# Patient Record
Sex: FEMALE | Race: WHITE | NOT HISPANIC OR LATINO | Employment: FULL TIME | URBAN - METROPOLITAN AREA
[De-identification: names, ages, dates, MRNs, and addresses within clinical notes are randomized per-mention and may not be internally consistent; named-entity substitution may affect disease eponyms.]

---

## 2023-03-20 ENCOUNTER — HOSPITAL ENCOUNTER (OUTPATIENT)
Dept: RADIOLOGY | Facility: HOSPITAL | Age: 53
Discharge: HOME | End: 2023-03-20
Attending: PODIATRIST
Payer: MEDICAID

## 2023-03-20 DIAGNOSIS — M77.41 METATARSALGIA OF RIGHT FOOT: ICD-10-CM

## 2023-03-20 DIAGNOSIS — M71.571 OTHER BURSITIS, NOT ELSEWHERE CLASSIFIED, RIGHT ANKLE AND FOOT: ICD-10-CM

## 2023-03-20 DIAGNOSIS — M77.41 METATARSALGIA OF RIGHT FOOT: Primary | ICD-10-CM

## 2023-03-20 PROCEDURE — 73630 X-RAY EXAM OF FOOT: CPT | Mod: 26,RT,, | Performed by: RADIOLOGY

## 2023-03-20 PROCEDURE — 73630 XR FOOT COMPLETE 3 VIEW RIGHT: ICD-10-PCS | Mod: 26,RT,, | Performed by: RADIOLOGY

## 2023-03-20 PROCEDURE — 73630 X-RAY EXAM OF FOOT: CPT | Mod: TC,FY,RT

## 2025-02-06 ENCOUNTER — HOSPITAL ENCOUNTER (EMERGENCY)
Facility: HOSPITAL | Age: 55
Discharge: HOME | End: 2025-02-06
Attending: EMERGENCY MEDICINE
Payer: MEDICAID

## 2025-02-06 VITALS
BODY MASS INDEX: 32.14 KG/M2 | TEMPERATURE: 99 F | DIASTOLIC BLOOD PRESSURE: 73 MMHG | SYSTOLIC BLOOD PRESSURE: 142 MMHG | OXYGEN SATURATION: 97 % | WEIGHT: 200 LBS | RESPIRATION RATE: 18 BRPM | HEIGHT: 66 IN | HEART RATE: 93 BPM

## 2025-02-06 DIAGNOSIS — M79.601 RIGHT ARM PAIN: ICD-10-CM

## 2025-02-06 DIAGNOSIS — S42.401A CLOSED FRACTURE DISLOCATION OF RIGHT ELBOW, INITIAL ENCOUNTER: ICD-10-CM

## 2025-02-06 DIAGNOSIS — M25.562 LEFT KNEE PAIN: ICD-10-CM

## 2025-02-06 DIAGNOSIS — M25.522 LEFT ELBOW PAIN: ICD-10-CM

## 2025-02-06 DIAGNOSIS — S00.83XA CONTUSION OF FACE, INITIAL ENCOUNTER: Primary | ICD-10-CM
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
## 2025-02-06 DIAGNOSIS — S00.81XA ABRASION OF FACE, INITIAL ENCOUNTER: ICD-10-CM

## 2025-02-06 DIAGNOSIS — S52.124A CLOSED NONDISPLACED FRACTURE OF HEAD OF RIGHT RADIUS, INITIAL ENCOUNTER: ICD-10-CM

## 2025-02-06 PROCEDURE — 63600175 PHARM REV CODE 636 W HCPCS: Mod: ER | Performed by: EMERGENCY MEDICINE

## 2025-02-06 PROCEDURE — 90471 IMMUNIZATION ADMIN: CPT | Mod: ER | Performed by: EMERGENCY MEDICINE

## 2025-02-06 PROCEDURE — 96374 THER/PROPH/DIAG INJ IV PUSH: CPT | Mod: ER

## 2025-02-06 PROCEDURE — 90715 TDAP VACCINE 7 YRS/> IM: CPT | Mod: ER | Performed by: EMERGENCY MEDICINE

## 2025-02-06 PROCEDURE — 25000003 PHARM REV CODE 250: Mod: ER | Performed by: EMERGENCY MEDICINE

## 2025-02-06 PROCEDURE — 99285 EMERGENCY DEPT VISIT HI MDM: CPT | Mod: 25,ER

## 2025-02-06 PROCEDURE — 96372 THER/PROPH/DIAG INJ SC/IM: CPT | Performed by: EMERGENCY MEDICINE

## 2025-02-06 RX ORDER — ORPHENADRINE CITRATE 100 MG/1
100 TABLET, EXTENDED RELEASE ORAL
Status: COMPLETED | OUTPATIENT
Start: 2025-02-06 | End: 2025-02-06

## 2025-02-06 RX ORDER — MUPIROCIN 20 MG/G
OINTMENT TOPICAL 3 TIMES DAILY
Qty: 60 G | Refills: 0 | Status: SHIPPED | OUTPATIENT
Start: 2025-02-06 | End: 2025-02-16

## 2025-02-06 RX ORDER — OXYCODONE AND ACETAMINOPHEN 5; 325 MG/1; MG/1
1 TABLET ORAL EVERY 4 HOURS PRN
Qty: 12 TABLET | Refills: 0 | Status: SHIPPED | OUTPATIENT
Start: 2025-02-06

## 2025-02-06 RX ORDER — METHOCARBAMOL 500 MG/1
1000 TABLET, FILM COATED ORAL 3 TIMES DAILY
Qty: 30 TABLET | Refills: 0 | Status: SHIPPED | OUTPATIENT
Start: 2025-02-06 | End: 2025-02-11

## 2025-02-06 RX ORDER — IBUPROFEN 600 MG/1
600 TABLET ORAL EVERY 6 HOURS PRN
Qty: 20 TABLET | Refills: 0 | Status: SHIPPED | OUTPATIENT
Start: 2025-02-06

## 2025-02-06 RX ORDER — MORPHINE SULFATE 4 MG/ML
4 INJECTION, SOLUTION INTRAMUSCULAR; INTRAVENOUS
Status: COMPLETED | OUTPATIENT
Start: 2025-02-06 | End: 2025-02-06

## 2025-02-06 RX ORDER — KETOROLAC TROMETHAMINE 30 MG/ML
30 INJECTION, SOLUTION INTRAMUSCULAR; INTRAVENOUS
Status: COMPLETED | OUTPATIENT
Start: 2025-02-06 | End: 2025-02-06

## 2025-02-06 RX ORDER — ACETAMINOPHEN 500 MG
500 TABLET ORAL EVERY 6 HOURS PRN
Qty: 30 TABLET | Refills: 0 | Status: SHIPPED | OUTPATIENT
Start: 2025-02-06

## 2025-02-06 RX ORDER — OXYCODONE AND ACETAMINOPHEN 5; 325 MG/1; MG/1
1 TABLET ORAL
Status: COMPLETED | OUTPATIENT
Start: 2025-02-06 | End: 2025-02-06

## 2025-02-06 RX ADMIN — CLOSTRIDIUM TETANI TOXOID ANTIGEN (FORMALDEHYDE INACTIVATED), CORYNEBACTERIUM DIPHTHERIAE TOXOID ANTIGEN (FORMALDEHYDE INACTIVATED), BORDETELLA PERTUSSIS TOXOID ANTIGEN (GLUTARALDEHYDE INACTIVATED), BORDETELLA PERTUSSIS FILAMENTOUS HEMAGGLUTININ ANTIGEN (FORMALDEHYDE INACTIVATED), BORDETELLA PERTUSSIS PERTACTIN ANTIGEN, AND BORDETELLA PERTUSSIS FIMBRIAE 2/3 ANTIGEN 0.5 ML: 5; 2; 2.5; 5; 3; 5 INJECTION, SUSPENSION INTRAMUSCULAR at 04:02

## 2025-02-06 RX ADMIN — ORPHENADRINE CITRATE 100 MG: 100 TABLET, EXTENDED RELEASE ORAL at 05:02

## 2025-02-06 RX ADMIN — KETOROLAC TROMETHAMINE 30 MG: 30 INJECTION, SOLUTION INTRAMUSCULAR at 05:02

## 2025-02-06 RX ADMIN — MORPHINE SULFATE 4 MG: 4 INJECTION, SOLUTION INTRAMUSCULAR; INTRAVENOUS at 06:02

## 2025-02-06 RX ADMIN — OXYCODONE HYDROCHLORIDE AND ACETAMINOPHEN 1 TABLET: 5; 325 TABLET ORAL at 04:02

## 2025-02-06 RX ADMIN — Medication: at 06:02

## 2025-02-06 NOTE — DISCHARGE INSTRUCTIONS
Follow up with Orthopedics this Monday at 1:00 p.m..  Wear your sling and swath.  Go to [facility] ED if symptoms worsen or do not improve.      Please follow up with primary care physician for further evaluation of abnormal CT showing: Left thyroid lobe 1.5 cm nodule. Future nonemergent outpatient thyroid ultrasound follow-up is recommended if not previously obtained.   Please sign up for and monitor all results on [facility] patient portal.    Please return to the ED for any new, concerning symptoms, or worsening symptoms.    Please follow-up with your primary care provider within 1 day.  An ER visit can not replace the evaluation by your primary care physician.    In the emergency department it is our goal to rule out life-threatening conditions and make sure that you are safe to be discharged home.    Many medical conditions are difficult to diagnose and may be impossible to diagnosed during a single ER visit.  Many health conditions start with nonspecific symptoms and can only be diagnosed on follow-up visits with your primary care physician or specialist.    Please be aware that all medical conditions can change and we can not predict how you will be feeling tomorrow or the next day.   If you have any worsening or new symptoms you should not hesitate to return to the emergency department for re-evaluation.  Be sure to follow up with your primary care physician for recheck and review any labs or imaging that were performed today.  It is very common for us to identify non emergent incidental findings on labs and imaging which must be followed up with your primary care physician.   If you do not have a primary care physician, you may contact the 1 listed on your discharge paperwork or you can also call the [facility] clinic appointment desk at [phone] to schedule an appointment.

## 2025-02-06 NOTE — ED PROVIDER NOTES
Encounter Date: 2/6/2025    SCRIBE #1 NOTE: I, Michelle Cramer, am scribing for, and in the presence of,  Lachelle Grayson DO.       History     Chief Complaint   Patient presents with    Fall     Pt had a trip and fall PTA injuring her nose, chin, bilateral face, and bilateral arms. Denies LOC     Eli Mojica is a 54 y.o. female, with no pertinent PMHx, who presents to the ED for evaluation following a mechanical fall PTA. Patient reports a trip and fall at school today, falling onto her face. States she was carrying her laptop and school bag, and tripped on the concrete ledge, falling onto concrete flat on her face. She denies LOC. She reports sustaining multiple bleeding wounds to her face, bilateral elbow pain, right hand pain, right forearm pain, left knee pain, and a mild posterior headache. Unknown last Tdap. No other exacerbating or alleviating factors. Denies fever, nausea, vomiting or other associated symptoms.       The history is provided by the patient. No  was used.     Review of patient's allergies indicates:  No Known Allergies  No past medical history on file.  No past surgical history on file.  No family history on file.     Review of Systems   Constitutional:  Negative for fever.   HENT:  Negative for nosebleeds, rhinorrhea and sore throat.    Eyes:  Negative for redness.   Respiratory:  Negative for shortness of breath.    Cardiovascular:  Negative for chest pain and leg swelling.   Gastrointestinal:  Negative for abdominal pain, diarrhea, nausea and vomiting.   Musculoskeletal:  Negative for back pain and neck pain.        (+) bilateral elbow pain   (+) left knee pain   (+) right hand pain   (+) right forearm pain    Skin:  Positive for wound. Negative for rash.   Neurological:  Positive for headaches. Negative for syncope.   All other systems reviewed and are negative.      Physical Exam     Initial Vitals [02/06/25 1607]   BP Pulse Resp Temp SpO2   (!) 147/92 88 16 98.5  °F (36.9 °C) 98 %      MAP       --         Physical Exam    Nursing note and vitals reviewed.  Constitutional: She appears well-developed and well-nourished.   HENT:   Head: Normocephalic.   Right Ear: Tympanic membrane and external ear normal.   Left Ear: Tympanic membrane and external ear normal.   Nose: Nose normal. Mouth/Throat: Oropharynx is clear and moist. Normal dentition.    Multiple abrasions to the face. No chipped teeth visualized. Oropharynx patent.    Eyes: Conjunctivae and EOM are normal. Pupils are equal, round, and reactive to light.   Neck: Phonation normal. Neck supple.   Normal range of motion.  Cardiovascular:  Normal rate, regular rhythm, normal heart sounds and intact distal pulses.     Exam reveals no gallop and no friction rub.       No murmur heard.  Pulmonary/Chest: Effort normal and breath sounds normal. No stridor. No respiratory distress. She has no wheezes. She has no rhonchi. She has no rales. She exhibits no tenderness.   Abdominal: Abdomen is soft. Bowel sounds are normal. She exhibits no distension. There is no abdominal tenderness. There is no rigidity, no rebound and no guarding.   Musculoskeletal:      Cervical back: Normal range of motion and neck supple.      Comments: Left elbow TTP. Swelling to right hand and forearm.     Neurological: She is alert and oriented to person, place, and time. She has normal strength. No cranial nerve deficit or sensory deficit. GCS score is 15. GCS eye subscore is 4. GCS verbal subscore is 5. GCS motor subscore is 6.   Skin: Skin is warm and dry. Capillary refill takes less than 2 seconds. No rash noted.   Psychiatric: She has a normal mood and affect. Her behavior is normal.         ED Course   Procedures  Labs Reviewed - No data to display       Imaging Results              X-Ray Humerus 2 View Right (Final result)  Result time 02/06/25 19:26:52      Final result by Sanam David MD (02/06/25 19:26:52)                   Impression:       As above.      Electronically signed by: Sanam David MD  Date:    02/06/2025  Time:    19:26               Narrative:    EXAMINATION:  XR HUMERUS 2 VIEW RIGHT    CLINICAL HISTORY:  Unspecified fall, initial encounter    TECHNIQUE:  Right humerus AP and lateral.    COMPARISON:  Right elbow radiographs from the same date.    FINDINGS:  Redemonstration of acute mild displaced radial head fracture.  No additional acute displaced fracture or dislocation seen.                                       CT Arm Elbow Without Contrast Right (Final result)  Result time 02/06/25 19:25:42      Final result by Sanam David MD (02/06/25 19:25:42)                   Impression:      Acute radial head fracture.      Electronically signed by: Sanam David MD  Date:    02/06/2025  Time:    19:25               Narrative:    EXAMINATION:  CT ARM ELBOW WITHOUT CONTRAST RIGHT    CLINICAL HISTORY:  Fracture, elbow;    TECHNIQUE:  CT of the right elbow was obtained without the use of IV contrast.  Sagittal and coronal reformats were obtained.    COMPARISON:  Right elbow radiographs from the same date.    FINDINGS:  Suboptimal positioning.  There is redemonstration of acute displaced fracture of the radial head.  This is better appreciated on earlier radiographs.  No additional acute displaced fracture or dislocation seen.  Elbow joint effusion is seen.                                       X-Ray Elbow Complete Right (Final result)  Result time 02/06/25 17:34:14      Final result by Sanam David MD (02/06/25 17:34:14)                   Impression:      Acute radial head fracture with elbow joint effusion.      Electronically signed by: Sanam David MD  Date:    02/06/2025  Time:    17:34               Narrative:    EXAMINATION:  XR ELBOW COMPLETE 3 VIEW RIGHT; XR FOREARM RIGHT; XR HAND COMPLETE 3 VIEW RIGHT    CLINICAL HISTORY:  Fall with right hand pain and swelling;. Unspecified fall, initial encounter; Pain in right  arm    TECHNIQUE:  AP, lateral, and oblique views of the right elbow were performed.  Right forearm AP and lateral.  Right hand three views.    COMPARISON:  None    FINDINGS:  Acute mild displaced fracture is seen of the radial head.  Elbow joint effusion is seen.  Elbow spurring is noted.  No additional acute displaced fracture or dislocation seen.                                       X-Ray Forearm Right (Final result)  Result time 02/06/25 17:34:14      Final result by Sanam David MD (02/06/25 17:34:14)                   Impression:      Acute radial head fracture with elbow joint effusion.      Electronically signed by: Sanam David MD  Date:    02/06/2025  Time:    17:34               Narrative:    EXAMINATION:  XR ELBOW COMPLETE 3 VIEW RIGHT; XR FOREARM RIGHT; XR HAND COMPLETE 3 VIEW RIGHT    CLINICAL HISTORY:  Fall with right hand pain and swelling;. Unspecified fall, initial encounter; Pain in right arm    TECHNIQUE:  AP, lateral, and oblique views of the right elbow were performed.  Right forearm AP and lateral.  Right hand three views.    COMPARISON:  None    FINDINGS:  Acute mild displaced fracture is seen of the radial head.  Elbow joint effusion is seen.  Elbow spurring is noted.  No additional acute displaced fracture or dislocation seen.                                       X-Ray Hand 3 view Right (Final result)  Result time 02/06/25 17:34:14      Final result by Sanam David MD (02/06/25 17:34:14)                   Impression:      Acute radial head fracture with elbow joint effusion.      Electronically signed by: Sanam David MD  Date:    02/06/2025  Time:    17:34               Narrative:    EXAMINATION:  XR ELBOW COMPLETE 3 VIEW RIGHT; XR FOREARM RIGHT; XR HAND COMPLETE 3 VIEW RIGHT    CLINICAL HISTORY:  Fall with right hand pain and swelling;. Unspecified fall, initial encounter; Pain in right arm    TECHNIQUE:  AP, lateral, and oblique views of the right elbow were performed.   Right forearm AP and lateral.  Right hand three views.    COMPARISON:  None    FINDINGS:  Acute mild displaced fracture is seen of the radial head.  Elbow joint effusion is seen.  Elbow spurring is noted.  No additional acute displaced fracture or dislocation seen.                                       X-Ray Elbow Complete Left (Final result)  Result time 02/06/25 17:35:32      Final result by Sanam David MD (02/06/25 17:35:32)                   Impression:      Elbow joint effusion.  No acute displaced fracture seen.      Electronically signed by: Sanam David MD  Date:    02/06/2025  Time:    17:35               Narrative:    EXAMINATION:  XR ELBOW COMPLETE 3 VIEW LEFT    CLINICAL HISTORY:  Pain in left elbow    TECHNIQUE:  AP, lateral, and oblique views of the left elbow were performed.    COMPARISON:  None    FINDINGS:  No acute displaced fracture is identified.  There is mild spurring at the coronoid process and medial and lateral epicondyles.  Elbow joint effusion is seen.                                       X-Ray Knee 3 View Left (Final result)  Result time 02/06/25 17:37:31      Final result by Sanam David MD (02/06/25 17:37:31)                   Impression:      No acute displaced fracture seen.      Electronically signed by: Sanam David MD  Date:    02/06/2025  Time:    17:37               Narrative:    EXAMINATION:  XR KNEE 3 VIEW LEFT    CLINICAL HISTORY:  Unspecified fall, initial encounter    TECHNIQUE:  AP, lateral, and Merchant views of the left knee were performed.    COMPARISON:  None    FINDINGS:  No evidence of acute displaced fracture, dislocation, or osseous destructive process.  Joint spaces appear fairly well preserved.  There is mild degenerative spurring involving the medial tibiofemoral compartment.  Prominent patellar spurring is seen.  There is prominent corticated calcification or possible fragmented spurring seen in the suprapatellar joint space.  No significant  suprapatellar joint effusion.                                        CT Cervical Spine Without Contrast (Final result)  Result time 02/06/25 17:24:31      Final result by Sanam David MD (02/06/25 17:24:31)                   Impression:      No evidence of acute cervical spine fracture or dislocation.    Left thyroid lobe 1.5 cm nodule.  Future nonemergent outpatient thyroid ultrasound follow-up is recommended if not previously obtained.    This report was flagged in Epic as containing an incidental finding.      Electronically signed by: Sanam David MD  Date:    02/06/2025  Time:    17:24               Narrative:    EXAMINATION:  CT CERVICAL SPINE WITHOUT CONTRAST    CLINICAL HISTORY:  Neck trauma, midline tenderness (Age 16-64y);    TECHNIQUE:  Low dose axial images, sagittal and coronal reformations were performed though the cervical spine.  Contrast was not administered.    COMPARISON:  None    FINDINGS:  No evidence of acute cervical spine fracture or dislocation.  Odontoid process is intact.  Craniocervical junction is unremarkable.  Cervical spine alignment is within normal limits.    Surrounding soft tissues show no acute abnormalities.  There is a 1.5 cm hypodense left thyroid lobe nodule..  Airway is patent.  Partially visualized lung apices are clear.                                       CT Maxillofacial Without Contrast (Final result)  Result time 02/06/25 17:22:33      Final result by Sanam David MD (02/06/25 17:22:33)                   Impression:      No acute intracranial abnormalities identified.    No acute facial fractures identified.      Electronically signed by: Sanam David MD  Date:    02/06/2025  Time:    17:22               Narrative:    EXAMINATION:  CT HEAD WITHOUT CONTRAST; CT MAXILLOFACIAL WITHOUT CONTRAST    CLINICAL HISTORY:  Head trauma, moderate-severe;; Facial trauma, blunt;    TECHNIQUE:  Low dose axial images were obtained through the head and maxillofacial  region.  Coronal and sagittal reformations were also performed. Contrast was not administered.  Rapid AI screening was performed.    COMPARISON:  None.    FINDINGS:  No evidence of acute/recent major vascular distribution cerebral infarction, intraparenchymal hemorrhage, or intra-axial space occupying lesion. The ventricular system is normal in size and configuration with no evidence of hydrocephalus. No effacement of the skull-base cisterns. No abnormal extra-axial fluid collections or blood products.    No acute facial fractures are identified.  Orbits are normal in appearance.  Visualized paranasal sinuses and mastoid air cells are clear. The calvarium shows no significant abnormality.                                       CT Head Without Contrast (Final result)  Result time 02/06/25 17:22:33      Final result by Sanam David MD (02/06/25 17:22:33)                   Impression:      No acute intracranial abnormalities identified.    No acute facial fractures identified.      Electronically signed by: Sanam David MD  Date:    02/06/2025  Time:    17:22               Narrative:    EXAMINATION:  CT HEAD WITHOUT CONTRAST; CT MAXILLOFACIAL WITHOUT CONTRAST    CLINICAL HISTORY:  Head trauma, moderate-severe;; Facial trauma, blunt;    TECHNIQUE:  Low dose axial images were obtained through the head and maxillofacial region.  Coronal and sagittal reformations were also performed. Contrast was not administered.  Rapid AI screening was performed.    COMPARISON:  None.    FINDINGS:  No evidence of acute/recent major vascular distribution cerebral infarction, intraparenchymal hemorrhage, or intra-axial space occupying lesion. The ventricular system is normal in size and configuration with no evidence of hydrocephalus. No effacement of the skull-base cisterns. No abnormal extra-axial fluid collections or blood products.    No acute facial fractures are identified.  Orbits are normal in appearance.  Visualized  paranasal sinuses and mastoid air cells are clear. The calvarium shows no significant abnormality.                                       Medications   Tdap vaccine injection 0.5 mL (0.5 mLs Intramuscular Given 2/6/25 1638)   oxyCODONE-acetaminophen 5-325 mg per tablet 1 tablet (1 tablet Oral Given 2/6/25 1645)   ketorolac injection 30 mg (30 mg Intramuscular Given 2/6/25 1759)   orphenadrine 12 hr tablet 100 mg (100 mg Oral Given 2/6/25 1759)   LETS (LIDOcaine-TETRAcaine-EPINEPHrine) gel solution ( Topical (Top) Given 2/6/25 1800)   morphine injection 4 mg (4 mg Intravenous Given 2/6/25 1811)     Medical Decision Making  Care of this patient was transferred to me (Dr. Pizarro) from Dr. Grayson at shift change pending results of CT of the right elbow.  CT of the right elbow reveals acute radial head fracture without involvement of the humerus.  Sling was placed and patient was advised to follow-up with orthopedic surgery clinic at 1:00 p.m. on Monday, February 10th-2025.  Prescriptions for Norco/ibuprofen/acetaminophen/mupirocin were given and she was also given  strict instructions to return to the emergency department if condition worsens.    Amount and/or Complexity of Data Reviewed  Radiology: ordered. Decision-making details documented in ED Course.    Risk  OTC drugs.  Prescription drug management.    Medical Decision Making:    This is an evaluation of a 54 y.o. female that presents to the Emergency Department for   Chief Complaint   Patient presents with    Fall     Pt had a trip and fall PTA injuring her nose, chin, bilateral face, and bilateral arms. Denies LOC         The patient is a non-toxic and well appearing patient. On physical exam, patient appears well hydrated with moist mucus membranes. Breath sounds are clear and equal bilaterally with no adventitious breath sounds, tachypnea or respiratory distress. Regular rate and rhythm. No murmurs. Abdomen soft and non tender. Patient is tolerating PO without  difficulty.  Multiple abrasions to the face. No chipped teeth visualized. Oropharynx patent. Left elbow TTP. Swelling to right hand and forearm. Physical exam otherwise as above.     I have reviewed vital signs and nursing notes.   Vital Signs Are Reassuring.     Based on the patient's symptoms, I am considering and evaluating for the following differential diagnoses: Sprain, Strain, Contusion, Dislocation, Fracture, abrasion        ED Course:Treatment in the ED included Physical Exam and medications given in ED  Medications   Tdap vaccine injection 0.5 mL (0.5 mLs Intramuscular Given 2/6/25 1638)   oxyCODONE-acetaminophen 5-325 mg per tablet 1 tablet (1 tablet Oral Given 2/6/25 1645)   ketorolac injection 30 mg (30 mg Intramuscular Given 2/6/25 1759)   orphenadrine 12 hr tablet 100 mg (100 mg Oral Given 2/6/25 1759)   LETS (LIDOcaine-TETRAcaine-EPINEPHrine) gel solution ( Topical (Top) Given 2/6/25 1800)   morphine injection 4 mg (4 mg Intravenous Given 2/6/25 1811)   I consulted orthopedics at 1750.       Patient reports feeling better after treatment in the ER.       In shared decision making with the patient, we discussed treatment, prescriptions, labs, and imaging results.    Discharge home with   ED Prescriptions       Medication Sig Dispense Start Date End Date Auth. Provider    methocarbamoL (ROBAXIN) 500 MG Tab Take 2 tablets (1,000 mg total) by mouth 3 (three) times daily. for 5 days 30 tablet 2/6/2025 2/11/2025 Lachelle Grayson,     acetaminophen (TYLENOL) 500 MG tablet Take 1 tablet (500 mg total) by mouth every 6 (six) hours as needed for Pain (As needed for mild-to-moderate pain). 30 tablet 2/6/2025 -- Lachelle Grayson,     ibuprofen (ADVIL,MOTRIN) 600 MG tablet Take 1 tablet (600 mg total) by mouth every 6 (six) hours as needed for Pain (Take with food as needed for mild-to-moderate pain). 20 tablet 2/6/2025 -- Lachelle Grayson, DO    mupirocin (BACTROBAN) 2 % ointment Apply topically 3 (three) times daily.  for 10 days 60 g 2/6/2025 2/16/2025 Lachelle Grayson DO    oxyCODONE-acetaminophen (PERCOCET) 5-325 mg per tablet Take 1 tablet by mouth every 4 (four) hours as needed for Pain. 12 tablet 2/6/2025 -- Lachelle Grayson DO            The following labs and imaging were reviewed:        No visits with results within 1 Day(s) from this visit.   Latest known visit with results is:   Lab Visit on 03/20/2023   Component Date Value Ref Range Status    Uric Acid 03/20/2023 5.1  2.4 - 5.7 mg/dL Final    WBC 03/20/2023 8.69  3.90 - 12.70 K/uL Final    RBC 03/20/2023 4.41  4.00 - 5.40 M/uL Final    Hemoglobin 03/20/2023 11.9 (L)  12.0 - 16.0 g/dL Final    Hematocrit 03/20/2023 36.8 (L)  37.0 - 48.5 % Final    MCV 03/20/2023 83  82 - 98 fL Final    MCH 03/20/2023 27.0  27.0 - 31.0 pg Final    MCHC 03/20/2023 32.3  32.0 - 36.0 g/dL Final    RDW 03/20/2023 12.7  11.5 - 14.5 % Final    Platelets 03/20/2023 354  150 - 450 K/uL Final    MPV 03/20/2023 9.1 (L)  9.2 - 12.9 fL Final    Immature Granulocytes 03/20/2023 0.5  0.0 - 0.5 % Final    Gran # (ANC) 03/20/2023 5.7  1.8 - 7.7 K/uL Final    Immature Grans (Abs) 03/20/2023 0.04  0.00 - 0.04 K/uL Final    Comment: Mild elevation in immature granulocytes is non specific and   can be seen in a variety of conditions including stress response,   acute inflammation, trauma and pregnancy. Correlation with other   laboratory and clinical findings is essential.      Lymph # 03/20/2023 2.2  1.0 - 4.8 K/uL Final    Mono # 03/20/2023 0.5  0.3 - 1.0 K/uL Final    Eos # 03/20/2023 0.2  0.0 - 0.5 K/uL Final    Baso # 03/20/2023 0.05  0.00 - 0.20 K/uL Final    nRBC 03/20/2023 0  0 /100 WBC Final    Gran % 03/20/2023 65.0  38.0 - 73.0 % Final    Lymph % 03/20/2023 25.2  18.0 - 48.0 % Final    Mono % 03/20/2023 6.2  4.0 - 15.0 % Final    Eosinophil % 03/20/2023 2.5  0.0 - 8.0 % Final    Basophil % 03/20/2023 0.6  0.0 - 1.9 % Final    Differential Method 03/20/2023 Automated   Final        Imaging Results               X-Ray Humerus 2 View Right (Final result)  Result time 02/06/25 19:26:52      Final result by Sanam David MD (02/06/25 19:26:52)                   Impression:      As above.      Electronically signed by: Sanam David MD  Date:    02/06/2025  Time:    19:26               Narrative:    EXAMINATION:  XR HUMERUS 2 VIEW RIGHT    CLINICAL HISTORY:  Unspecified fall, initial encounter    TECHNIQUE:  Right humerus AP and lateral.    COMPARISON:  Right elbow radiographs from the same date.    FINDINGS:  Redemonstration of acute mild displaced radial head fracture.  No additional acute displaced fracture or dislocation seen.                                       CT Arm Elbow Without Contrast Right (Final result)  Result time 02/06/25 19:25:42      Final result by Sanam David MD (02/06/25 19:25:42)                   Impression:      Acute radial head fracture.      Electronically signed by: Sanam David MD  Date:    02/06/2025  Time:    19:25               Narrative:    EXAMINATION:  CT ARM ELBOW WITHOUT CONTRAST RIGHT    CLINICAL HISTORY:  Fracture, elbow;    TECHNIQUE:  CT of the right elbow was obtained without the use of IV contrast.  Sagittal and coronal reformats were obtained.    COMPARISON:  Right elbow radiographs from the same date.    FINDINGS:  Suboptimal positioning.  There is redemonstration of acute displaced fracture of the radial head.  This is better appreciated on earlier radiographs.  No additional acute displaced fracture or dislocation seen.  Elbow joint effusion is seen.                                       X-Ray Elbow Complete Right (Final result)  Result time 02/06/25 17:34:14      Final result by Sanam David MD (02/06/25 17:34:14)                   Impression:      Acute radial head fracture with elbow joint effusion.      Electronically signed by: Sanam David MD  Date:    02/06/2025  Time:    17:34               Narrative:    EXAMINATION:  XR ELBOW COMPLETE 3  VIEW RIGHT; XR FOREARM RIGHT; XR HAND COMPLETE 3 VIEW RIGHT    CLINICAL HISTORY:  Fall with right hand pain and swelling;. Unspecified fall, initial encounter; Pain in right arm    TECHNIQUE:  AP, lateral, and oblique views of the right elbow were performed.  Right forearm AP and lateral.  Right hand three views.    COMPARISON:  None    FINDINGS:  Acute mild displaced fracture is seen of the radial head.  Elbow joint effusion is seen.  Elbow spurring is noted.  No additional acute displaced fracture or dislocation seen.                                       X-Ray Forearm Right (Final result)  Result time 02/06/25 17:34:14      Final result by Sanam David MD (02/06/25 17:34:14)                   Impression:      Acute radial head fracture with elbow joint effusion.      Electronically signed by: Sanam David MD  Date:    02/06/2025  Time:    17:34               Narrative:    EXAMINATION:  XR ELBOW COMPLETE 3 VIEW RIGHT; XR FOREARM RIGHT; XR HAND COMPLETE 3 VIEW RIGHT    CLINICAL HISTORY:  Fall with right hand pain and swelling;. Unspecified fall, initial encounter; Pain in right arm    TECHNIQUE:  AP, lateral, and oblique views of the right elbow were performed.  Right forearm AP and lateral.  Right hand three views.    COMPARISON:  None    FINDINGS:  Acute mild displaced fracture is seen of the radial head.  Elbow joint effusion is seen.  Elbow spurring is noted.  No additional acute displaced fracture or dislocation seen.                                       X-Ray Hand 3 view Right (Final result)  Result time 02/06/25 17:34:14      Final result by Sanam David MD (02/06/25 17:34:14)                   Impression:      Acute radial head fracture with elbow joint effusion.      Electronically signed by: Sanam David MD  Date:    02/06/2025  Time:    17:34               Narrative:    EXAMINATION:  XR ELBOW COMPLETE 3 VIEW RIGHT; XR FOREARM RIGHT; XR HAND COMPLETE 3 VIEW RIGHT    CLINICAL  HISTORY:  Fall with right hand pain and swelling;. Unspecified fall, initial encounter; Pain in right arm    TECHNIQUE:  AP, lateral, and oblique views of the right elbow were performed.  Right forearm AP and lateral.  Right hand three views.    COMPARISON:  None    FINDINGS:  Acute mild displaced fracture is seen of the radial head.  Elbow joint effusion is seen.  Elbow spurring is noted.  No additional acute displaced fracture or dislocation seen.                                       X-Ray Elbow Complete Left (Final result)  Result time 02/06/25 17:35:32      Final result by Sanam David MD (02/06/25 17:35:32)                   Impression:      Elbow joint effusion.  No acute displaced fracture seen.      Electronically signed by: Sanam David MD  Date:    02/06/2025  Time:    17:35               Narrative:    EXAMINATION:  XR ELBOW COMPLETE 3 VIEW LEFT    CLINICAL HISTORY:  Pain in left elbow    TECHNIQUE:  AP, lateral, and oblique views of the left elbow were performed.    COMPARISON:  None    FINDINGS:  No acute displaced fracture is identified.  There is mild spurring at the coronoid process and medial and lateral epicondyles.  Elbow joint effusion is seen.                                       X-Ray Knee 3 View Left (Final result)  Result time 02/06/25 17:37:31      Final result by Sanam David MD (02/06/25 17:37:31)                   Impression:      No acute displaced fracture seen.      Electronically signed by: Sanam David MD  Date:    02/06/2025  Time:    17:37               Narrative:    EXAMINATION:  XR KNEE 3 VIEW LEFT    CLINICAL HISTORY:  Unspecified fall, initial encounter    TECHNIQUE:  AP, lateral, and Merchant views of the left knee were performed.    COMPARISON:  None    FINDINGS:  No evidence of acute displaced fracture, dislocation, or osseous destructive process.  Joint spaces appear fairly well preserved.  There is mild degenerative spurring involving the medial  tibiofemoral compartment.  Prominent patellar spurring is seen.  There is prominent corticated calcification or possible fragmented spurring seen in the suprapatellar joint space.  No significant suprapatellar joint effusion.                                        CT Cervical Spine Without Contrast (Final result)  Result time 02/06/25 17:24:31      Final result by Sanam David MD (02/06/25 17:24:31)                   Impression:      No evidence of acute cervical spine fracture or dislocation.    Left thyroid lobe 1.5 cm nodule.  Future nonemergent outpatient thyroid ultrasound follow-up is recommended if not previously obtained.    This report was flagged in Epic as containing an incidental finding.      Electronically signed by: Sanam David MD  Date:    02/06/2025  Time:    17:24               Narrative:    EXAMINATION:  CT CERVICAL SPINE WITHOUT CONTRAST    CLINICAL HISTORY:  Neck trauma, midline tenderness (Age 16-64y);    TECHNIQUE:  Low dose axial images, sagittal and coronal reformations were performed though the cervical spine.  Contrast was not administered.    COMPARISON:  None    FINDINGS:  No evidence of acute cervical spine fracture or dislocation.  Odontoid process is intact.  Craniocervical junction is unremarkable.  Cervical spine alignment is within normal limits.    Surrounding soft tissues show no acute abnormalities.  There is a 1.5 cm hypodense left thyroid lobe nodule..  Airway is patent.  Partially visualized lung apices are clear.                                       CT Maxillofacial Without Contrast (Final result)  Result time 02/06/25 17:22:33      Final result by Sanam David MD (02/06/25 17:22:33)                   Impression:      No acute intracranial abnormalities identified.    No acute facial fractures identified.      Electronically signed by: Sanam David MD  Date:    02/06/2025  Time:    17:22               Narrative:    EXAMINATION:  CT HEAD WITHOUT CONTRAST; CT  MAXILLOFACIAL WITHOUT CONTRAST    CLINICAL HISTORY:  Head trauma, moderate-severe;; Facial trauma, blunt;    TECHNIQUE:  Low dose axial images were obtained through the head and maxillofacial region.  Coronal and sagittal reformations were also performed. Contrast was not administered.  Rapid AI screening was performed.    COMPARISON:  None.    FINDINGS:  No evidence of acute/recent major vascular distribution cerebral infarction, intraparenchymal hemorrhage, or intra-axial space occupying lesion. The ventricular system is normal in size and configuration with no evidence of hydrocephalus. No effacement of the skull-base cisterns. No abnormal extra-axial fluid collections or blood products.    No acute facial fractures are identified.  Orbits are normal in appearance.  Visualized paranasal sinuses and mastoid air cells are clear. The calvarium shows no significant abnormality.                                       CT Head Without Contrast (Final result)  Result time 02/06/25 17:22:33      Final result by Sanam David MD (02/06/25 17:22:33)                   Impression:      No acute intracranial abnormalities identified.    No acute facial fractures identified.      Electronically signed by: Sanam David MD  Date:    02/06/2025  Time:    17:22               Narrative:    EXAMINATION:  CT HEAD WITHOUT CONTRAST; CT MAXILLOFACIAL WITHOUT CONTRAST    CLINICAL HISTORY:  Head trauma, moderate-severe;; Facial trauma, blunt;    TECHNIQUE:  Low dose axial images were obtained through the head and maxillofacial region.  Coronal and sagittal reformations were also performed. Contrast was not administered.  Rapid AI screening was performed.    COMPARISON:  None.    FINDINGS:  No evidence of acute/recent major vascular distribution cerebral infarction, intraparenchymal hemorrhage, or intra-axial space occupying lesion. The ventricular system is normal in size and configuration with no evidence of hydrocephalus. No  effacement of the skull-base cisterns. No abnormal extra-axial fluid collections or blood products.    No acute facial fractures are identified.  Orbits are normal in appearance.  Visualized paranasal sinuses and mastoid air cells are clear. The calvarium shows no significant abnormality.                                            Scribe Attestation:   Scribe #1: I performed the above scribed service and the documentation accurately describes the services I performed. I attest to the accuracy of the note.        ED Course as of 02/07/25 1137   Thu Feb 06, 2025   1747 Orthopedic surgery consult placed.  Awaiting recommendations [RF]   1750 Consulted Orthopedics Dr. Martins.  Specialist is requesting CT of elbow in the ED. after CT is completed patient should be placed in a sling and swath.  Patient needs pain control.  Patient is to wear sling and swath until follow up with orthopedic specialist.  Patient is to see Dr. Martins Monday at 1:00 p.m. at his [facility].  Care of patient turned over to Dr. Pizarro at 6:00 p.m..  We discussed patient's presentation, past medical history, physical exam, pending CT, and need for sling and swath.  Discussed with patient and family need for orthopedic follow up, CT scan, and sling and swath.  At time of turnover patient is awake alert oriented x4 feeling much better post treatment in the ED. [RF]      ED Course User Index  [RF] Lachelle Grayson,                      This document was produced by a scribe under my direction and in my presence. I agree with the content of the note and have made any necessary edits.     Dr. Pizarro    02/07/2025 7:37 PM        Clinical Impression:  Final diagnoses:  [W19.XXXA] Fall  [M25.562] Left knee pain  [M25.522] Left elbow pain  [M79.601] Right arm pain  [S00.83XA] Contusion of face, initial encounter (Primary)  [S00.81XA] Abrasion of face, initial encounter  [S42.401A] Closed fracture dislocation of right elbow, initial encounter  [S52.124A]  Closed nondisplaced fracture of head of right radius, initial encounter          ED Disposition Condition    Discharge Stable          ED Prescriptions       Medication Sig Dispense Start Date End Date Auth. Provider    methocarbamoL (ROBAXIN) 500 MG Tab Take 2 tablets (1,000 mg total) by mouth 3 (three) times daily. for 5 days 30 tablet 2/6/2025 2/11/2025 Lachelle Grayson DO    acetaminophen (TYLENOL) 500 MG tablet Take 1 tablet (500 mg total) by mouth every 6 (six) hours as needed for Pain (As needed for mild-to-moderate pain). 30 tablet 2/6/2025 -- Lachelle Grayson DO    ibuprofen (ADVIL,MOTRIN) 600 MG tablet Take 1 tablet (600 mg total) by mouth every 6 (six) hours as needed for Pain (Take with food as needed for mild-to-moderate pain). 20 tablet 2/6/2025 -- Lachelle Grayson DO    mupirocin (BACTROBAN) 2 % ointment Apply topically 3 (three) times daily. for 10 days 60 g 2/6/2025 2/16/2025 Lachelle Grayson DO    oxyCODONE-acetaminophen (PERCOCET) 5-325 mg per tablet Take 1 tablet by mouth every 4 (four) hours as needed for Pain. 12 tablet 2/6/2025 -- Lachelle Grayson DO          Follow-up Information       Follow up With Specialties Details Why Contact Info    Octavio Martins MD Orthopedic Surgery Schedule an appointment as soon as possible for a visit on 2/10/2025 Follow-up Monday at 1:00 p.m. [id number]  [address]  [facility] [address]  [phone]      [facility] Emergency [facility] Emergency Medicine Go to  Please go to [facility] emergency department if symptoms worsen [address]  [facility] [place] [id number] [phone]             Juan Pizarro MD  02/06/25 1953       Lachelle Grayson DO  02/07/25 1130

## 2025-02-06 NOTE — Clinical Note
"Eli"Alberto Mojica was seen and treated in our emergency department on 2/6/2025.  She may return to work on 02/11/2025.       If you have any questions or concerns, please don't hesitate to call.      Lachelle Grayson, DO"

## 2025-02-07 NOTE — ED PROVIDER NOTES
Encounter Date: 2/6/2025       History     Chief Complaint   Patient presents with    Fall     Pt had a trip and fall PTA injuring her nose, chin, bilateral face, and bilateral arms. Denies LOC     HPI  Review of patient's allergies indicates:  No Known Allergies  No past medical history on file.  No past surgical history on file.  No family history on file.     Review of Systems    Physical Exam     Initial Vitals [02/06/25 1607]   BP Pulse Resp Temp SpO2   (!) 147/92 88 16 98.5 °F (36.9 °C) 98 %      MAP       --         Physical Exam    ED Course   Procedures  Labs Reviewed - No data to display       Imaging Results              X-Ray Humerus 2 View Right (Final result)  Result time 02/06/25 19:26:52      Final result by Sanam David MD (02/06/25 19:26:52)                   Impression:      As above.      Electronically signed by: Sanam David MD  Date:    02/06/2025  Time:    19:26               Narrative:    EXAMINATION:  XR HUMERUS 2 VIEW RIGHT    CLINICAL HISTORY:  Unspecified fall, initial encounter    TECHNIQUE:  Right humerus AP and lateral.    COMPARISON:  Right elbow radiographs from the same date.    FINDINGS:  Redemonstration of acute mild displaced radial head fracture.  No additional acute displaced fracture or dislocation seen.                                       CT Arm Elbow Without Contrast Right (Final result)  Result time 02/06/25 19:25:42      Final result by Sanam David MD (02/06/25 19:25:42)                   Impression:      Acute radial head fracture.      Electronically signed by: Sanam David MD  Date:    02/06/2025  Time:    19:25               Narrative:    EXAMINATION:  CT ARM ELBOW WITHOUT CONTRAST RIGHT    CLINICAL HISTORY:  Fracture, elbow;    TECHNIQUE:  CT of the right elbow was obtained without the use of IV contrast.  Sagittal and coronal reformats were obtained.    COMPARISON:  Right elbow radiographs from the same date.    FINDINGS:  Suboptimal positioning.   There is redemonstration of acute displaced fracture of the radial head.  This is better appreciated on earlier radiographs.  No additional acute displaced fracture or dislocation seen.  Elbow joint effusion is seen.                                       X-Ray Elbow Complete Right (Final result)  Result time 02/06/25 17:34:14      Final result by Sanam David MD (02/06/25 17:34:14)                   Impression:      Acute radial head fracture with elbow joint effusion.      Electronically signed by: Sanam David MD  Date:    02/06/2025  Time:    17:34               Narrative:    EXAMINATION:  XR ELBOW COMPLETE 3 VIEW RIGHT; XR FOREARM RIGHT; XR HAND COMPLETE 3 VIEW RIGHT    CLINICAL HISTORY:  Fall with right hand pain and swelling;. Unspecified fall, initial encounter; Pain in right arm    TECHNIQUE:  AP, lateral, and oblique views of the right elbow were performed.  Right forearm AP and lateral.  Right hand three views.    COMPARISON:  None    FINDINGS:  Acute mild displaced fracture is seen of the radial head.  Elbow joint effusion is seen.  Elbow spurring is noted.  No additional acute displaced fracture or dislocation seen.                                       X-Ray Forearm Right (Final result)  Result time 02/06/25 17:34:14      Final result by Sanam David MD (02/06/25 17:34:14)                   Impression:      Acute radial head fracture with elbow joint effusion.      Electronically signed by: Sanam David MD  Date:    02/06/2025  Time:    17:34               Narrative:    EXAMINATION:  XR ELBOW COMPLETE 3 VIEW RIGHT; XR FOREARM RIGHT; XR HAND COMPLETE 3 VIEW RIGHT    CLINICAL HISTORY:  Fall with right hand pain and swelling;. Unspecified fall, initial encounter; Pain in right arm    TECHNIQUE:  AP, lateral, and oblique views of the right elbow were performed.  Right forearm AP and lateral.  Right hand three views.    COMPARISON:  None    FINDINGS:  Acute mild displaced fracture is seen of  the radial head.  Elbow joint effusion is seen.  Elbow spurring is noted.  No additional acute displaced fracture or dislocation seen.                                       X-Ray Hand 3 view Right (Final result)  Result time 02/06/25 17:34:14      Final result by Sanam David MD (02/06/25 17:34:14)                   Impression:      Acute radial head fracture with elbow joint effusion.      Electronically signed by: Sanam David MD  Date:    02/06/2025  Time:    17:34               Narrative:    EXAMINATION:  XR ELBOW COMPLETE 3 VIEW RIGHT; XR FOREARM RIGHT; XR HAND COMPLETE 3 VIEW RIGHT    CLINICAL HISTORY:  Fall with right hand pain and swelling;. Unspecified fall, initial encounter; Pain in right arm    TECHNIQUE:  AP, lateral, and oblique views of the right elbow were performed.  Right forearm AP and lateral.  Right hand three views.    COMPARISON:  None    FINDINGS:  Acute mild displaced fracture is seen of the radial head.  Elbow joint effusion is seen.  Elbow spurring is noted.  No additional acute displaced fracture or dislocation seen.                                       X-Ray Elbow Complete Left (Final result)  Result time 02/06/25 17:35:32      Final result by Sanam David MD (02/06/25 17:35:32)                   Impression:      Elbow joint effusion.  No acute displaced fracture seen.      Electronically signed by: Sanam David MD  Date:    02/06/2025  Time:    17:35               Narrative:    EXAMINATION:  XR ELBOW COMPLETE 3 VIEW LEFT    CLINICAL HISTORY:  Pain in left elbow    TECHNIQUE:  AP, lateral, and oblique views of the left elbow were performed.    COMPARISON:  None    FINDINGS:  No acute displaced fracture is identified.  There is mild spurring at the coronoid process and medial and lateral epicondyles.  Elbow joint effusion is seen.                                       X-Ray Knee 3 View Left (Final result)  Result time 02/06/25 17:37:31      Final result by Sanam David  MD WILLY (02/06/25 17:37:31)                   Impression:      No acute displaced fracture seen.      Electronically signed by: Sanam David MD  Date:    02/06/2025  Time:    17:37               Narrative:    EXAMINATION:  XR KNEE 3 VIEW LEFT    CLINICAL HISTORY:  Unspecified fall, initial encounter    TECHNIQUE:  AP, lateral, and Merchant views of the left knee were performed.    COMPARISON:  None    FINDINGS:  No evidence of acute displaced fracture, dislocation, or osseous destructive process.  Joint spaces appear fairly well preserved.  There is mild degenerative spurring involving the medial tibiofemoral compartment.  Prominent patellar spurring is seen.  There is prominent corticated calcification or possible fragmented spurring seen in the suprapatellar joint space.  No significant suprapatellar joint effusion.                                        CT Cervical Spine Without Contrast (Final result)  Result time 02/06/25 17:24:31      Final result by Sanam Davdi MD (02/06/25 17:24:31)                   Impression:      No evidence of acute cervical spine fracture or dislocation.    Left thyroid lobe 1.5 cm nodule.  Future nonemergent outpatient thyroid ultrasound follow-up is recommended if not previously obtained.    This report was flagged in Epic as containing an incidental finding.      Electronically signed by: Sanam David MD  Date:    02/06/2025  Time:    17:24               Narrative:    EXAMINATION:  CT CERVICAL SPINE WITHOUT CONTRAST    CLINICAL HISTORY:  Neck trauma, midline tenderness (Age 16-64y);    TECHNIQUE:  Low dose axial images, sagittal and coronal reformations were performed though the cervical spine.  Contrast was not administered.    COMPARISON:  None    FINDINGS:  No evidence of acute cervical spine fracture or dislocation.  Odontoid process is intact.  Craniocervical junction is unremarkable.  Cervical spine alignment is within normal limits.    Surrounding soft tissues show  no acute abnormalities.  There is a 1.5 cm hypodense left thyroid lobe nodule..  Airway is patent.  Partially visualized lung apices are clear.                                       CT Maxillofacial Without Contrast (Final result)  Result time 02/06/25 17:22:33      Final result by Sanam David MD (02/06/25 17:22:33)                   Impression:      No acute intracranial abnormalities identified.    No acute facial fractures identified.      Electronically signed by: Sanam David MD  Date:    02/06/2025  Time:    17:22               Narrative:    EXAMINATION:  CT HEAD WITHOUT CONTRAST; CT MAXILLOFACIAL WITHOUT CONTRAST    CLINICAL HISTORY:  Head trauma, moderate-severe;; Facial trauma, blunt;    TECHNIQUE:  Low dose axial images were obtained through the head and maxillofacial region.  Coronal and sagittal reformations were also performed. Contrast was not administered.  Rapid AI screening was performed.    COMPARISON:  None.    FINDINGS:  No evidence of acute/recent major vascular distribution cerebral infarction, intraparenchymal hemorrhage, or intra-axial space occupying lesion. The ventricular system is normal in size and configuration with no evidence of hydrocephalus. No effacement of the skull-base cisterns. No abnormal extra-axial fluid collections or blood products.    No acute facial fractures are identified.  Orbits are normal in appearance.  Visualized paranasal sinuses and mastoid air cells are clear. The calvarium shows no significant abnormality.                                       CT Head Without Contrast (Final result)  Result time 02/06/25 17:22:33      Final result by Sanam David MD (02/06/25 17:22:33)                   Impression:      No acute intracranial abnormalities identified.    No acute facial fractures identified.      Electronically signed by: Sanam David MD  Date:    02/06/2025  Time:    17:22               Narrative:    EXAMINATION:  CT HEAD WITHOUT CONTRAST; CT  MAXILLOFACIAL WITHOUT CONTRAST    CLINICAL HISTORY:  Head trauma, moderate-severe;; Facial trauma, blunt;    TECHNIQUE:  Low dose axial images were obtained through the head and maxillofacial region.  Coronal and sagittal reformations were also performed. Contrast was not administered.  Rapid AI screening was performed.    COMPARISON:  None.    FINDINGS:  No evidence of acute/recent major vascular distribution cerebral infarction, intraparenchymal hemorrhage, or intra-axial space occupying lesion. The ventricular system is normal in size and configuration with no evidence of hydrocephalus. No effacement of the skull-base cisterns. No abnormal extra-axial fluid collections or blood products.    No acute facial fractures are identified.  Orbits are normal in appearance.  Visualized paranasal sinuses and mastoid air cells are clear. The calvarium shows no significant abnormality.                                       Medications   Tdap vaccine injection 0.5 mL (0.5 mLs Intramuscular Given 2/6/25 1638)   oxyCODONE-acetaminophen 5-325 mg per tablet 1 tablet (1 tablet Oral Given 2/6/25 1645)   ketorolac injection 30 mg (30 mg Intramuscular Given 2/6/25 1759)   orphenadrine 12 hr tablet 100 mg (100 mg Oral Given 2/6/25 1759)   LETS (LIDOcaine-TETRAcaine-EPINEPHrine) gel solution ( Topical (Top) Given 2/6/25 1800)   morphine injection 4 mg (4 mg Intravenous Given 2/6/25 1811)     Medical Decision Making  Amount and/or Complexity of Data Reviewed  Radiology: ordered.    Risk  OTC drugs.  Prescription drug management.               ED Course as of 02/07/25 1136   Thu Feb 06, 2025   1747 Orthopedic surgery consult placed.  Awaiting recommendations [RF]   1750 Consulted Orthopedics Dr. Martins.  Specialist is requesting CT of elbow in the ED. after CT is completed patient should be placed in a sling and swath.  Patient needs pain control.  Patient is to wear sling and swath until follow up with orthopedic specialist.  Patient is  to see Dr. Martins Monday at 1:00 p.m. at his [facility].  Care of patient turned over to Dr. Pizarro at 6:00 p.m..  We discussed patient's presentation, past medical history, physical exam, pending CT, and need for sling and swath.  Discussed with patient and family need for orthopedic follow up, CT scan, and sling and swath.  At time of turnover patient is awake alert oriented x4 feeling much better post treatment in the ED. [RF]      ED Course User Index  [RF] Lachelle Grayson DO                           Clinical Impression:  Final diagnoses:  [W19.XXXA] Fall  [M25.562] Left knee pain  [M25.522] Left elbow pain  [M79.601] Right arm pain  [S00.83XA] Contusion of face, initial encounter (Primary)  [S00.81XA] Abrasion of face, initial encounter  [S42.401A] Closed fracture dislocation of right elbow, initial encounter  [S52.124A] Closed nondisplaced fracture of head of right radius, initial encounter          ED Disposition Condition    Discharge Stable          ED Prescriptions       Medication Sig Dispense Start Date End Date Auth. Provider    methocarbamoL (ROBAXIN) 500 MG Tab Take 2 tablets (1,000 mg total) by mouth 3 (three) times daily. for 5 days 30 tablet 2/6/2025 2/11/2025 Lachelle Grayson DO    acetaminophen (TYLENOL) 500 MG tablet Take 1 tablet (500 mg total) by mouth every 6 (six) hours as needed for Pain (As needed for mild-to-moderate pain). 30 tablet 2/6/2025 -- Lachelle Grayson DO    ibuprofen (ADVIL,MOTRIN) 600 MG tablet Take 1 tablet (600 mg total) by mouth every 6 (six) hours as needed for Pain (Take with food as needed for mild-to-moderate pain). 20 tablet 2/6/2025 -- Lachelle Grayson DO    mupirocin (BACTROBAN) 2 % ointment Apply topically 3 (three) times daily. for 10 days 60 g 2/6/2025 2/16/2025 Lachelle Grayson DO    oxyCODONE-acetaminophen (PERCOCET) 5-325 mg per tablet Take 1 tablet by mouth every 4 (four) hours as needed for Pain. 12 tablet 2/6/2025 -- Lachelle Grayson, DO          Follow-up Information        Follow up With Specialties Details Why Contact Info    Octavio Martins MD Orthopedic Surgery Schedule an appointment as soon as possible for a visit on 2/10/2025 Follow-up Monday at 1:00 p.m. [address]  [facility] [address]  [facility] [address]  [phone]      [facility] Emergency Dept Emergency Medicine Go to  Please go to [facility] emergency department if symptoms worsen [address]  [facility] [place] [address]  [phone]             Lachelle Grayson DO  02/07/25 1136

## 2025-02-07 NOTE — ED NOTES
Applied sling and swath to right arm. Assisted pt into vehicle. Nadn. Resp eu. Vss. Will follow up with ortho on Monday at 1pm.